# Patient Record
Sex: MALE | URBAN - NONMETROPOLITAN AREA
[De-identification: names, ages, dates, MRNs, and addresses within clinical notes are randomized per-mention and may not be internally consistent; named-entity substitution may affect disease eponyms.]

---

## 2020-05-08 ENCOUNTER — NURSE TRIAGE (OUTPATIENT)
Dept: CALL CENTER | Facility: HOSPITAL | Age: 20
End: 2020-05-08

## 2020-05-08 NOTE — TELEPHONE ENCOUNTER
"    Reason for Disposition  • [1] Dry, itchy skin AND [2] due to soaps or cold/dry weather    Additional Information  • Negative: Red, scaly, itchy rash between the toes  • Negative: Blisters rather than a crack  • Negative: Child sounds very sick or weak to the triager  • Negative: [1] Looks infected (spreading redness, red streak) AND [2] fever  • Negative: [1] Looks infected AND [2] large red area (> 2 in. or 5 cm)  • Negative: [1] Cracked red lips AND [2] fever present > 5 days  • Negative: [1] Looks infected (spreading redness, red streak, pus) AND [2] no fever  • Negative: [1] Bleeding from cracked lips AND [2] 3 or more times  • Negative: [1] Cracks on feet AND [2] interferes with walking  • Negative: Cracks from thumb-sucking or finger-sucking  • Negative: [1] Widespread peeling skin AND [2] cause unknown  • Negative: [1] After 2 weeks of treatment AND [2] cracked lips not healed  • Negative: [1] After 2 weeks of treatment AND [2] cracked skin not healed  • Negative: [1] After 2 weeks of treatment AND [2] dry skin is still itchy (Exception: chronic problem)  • Negative: [1] Patches of dry, itchy skin AND [2] a chronic problem  • Negative: Cracked skin on the feet  • Negative: Cracked skin on the hands  • Negative: Chapped lips    Answer Assessment - Initial Assessment Questions  1. APPEARANCE of SKIN: \"What does it look like?\"      Dry skin  2. PAIN: \"Is there any pain?\" If so, ask: \"How bad is it?\"      No  3. LOCATION: \"Where is the cracked skin located?\"      Cheek bone and forehead  4. ONSET: \"When did the cracked skin begin?\"      Yesterday  5. CAUSE: \"What do you think is causing the cracked skin?\"      Did not immediately apply lotion after his shower  6. INFECTION: \"Does it look infected?\"      No redness    Protocols used: CRACKED OR DRY SKIN-PEDIATRIC-AH      "

## 2020-05-09 ENCOUNTER — NURSE TRIAGE (OUTPATIENT)
Dept: CALL CENTER | Facility: HOSPITAL | Age: 20
End: 2020-05-09

## 2020-05-09 NOTE — TELEPHONE ENCOUNTER
Reason for Disposition  • [1] Localized peeling skin AND [2] present > 7 days    Additional Information  • Negative: Sounds like a life-threatening emergency to the triager  • Negative: Eczema has been diagnosed  • Negative: [1] Age < 2 years AND [2] in the diaper area  • Negative: Rash begins in the first week of life  • Negative: [1] Between the toes AND [2] itchy rash  • Negative: [1] Near the nostrils (nasal openings) AND [2] sores or scabs  • Negative: Acne on the face in school-aged child or older  • Negative: Fifth Disease suspected (red cheeks on both sides and no fever now)  • Negative: Ringworm suspected (round pink patch, slowly increasing in size)  • Negative: Wart, suspected or diagnosed  • Negative: Mosquito bite suspected  • Negative: Insect bite suspected  • Negative: Boil suspected (very painful, red lump)  • Negative: Small red spots or water blisters on the palms, soles, fingers and toes  • Negative: [1] Blisters of hands or feet AND [2] from friction  • Negative: [1] Chickenpox vaccine within last 3 weeks AND [2] several small water blisters or bumps  • Negative: Poison ivy, oak or sumac contact suspected  • Negative: Wound infection suspected (spreading redness or pus) in traumatic wound  • Negative: Wound infection suspected (spreading redness or pus) in surgical wound  • Negative: Impetigo suspected (superficial small sores usually covered by a soft yellow scab)  • Negative: Sores or skin ulcers, not a rash  • Negative: Localized lump (or swelling) without redness or rash  • Negative: Shingles (zoster) suspected (Rash grouped in a stripe or band on one side of body. Starts with red bumps changing to water blisters).  • Negative: Jock itch rash suspected (red itchy rash on inner upper thighs near genital area that starts in the groin crease)  • Negative: [1] Localized purple or blood-colored spots or dots AND [2] not from injury or friction AND [3] fever  • Negative: [1] Baby < 1 month old  "AND [2] tiny water blisters or pimples (like chickenpox) (Exception : If it looks like erythema toxicum: 1-inch red blotches with a tiny white lump in the center that look like insect bites, continue with triage)  • Negative: Child sounds very sick or weak to the triager  • Negative: [1] Localized purple or blood-colored spots or dots AND [2] not from injury or friction AND [3] no fever  • Negative: [1] Fever AND [2] bright red area or red streak  • Negative: [1] Fever AND [2] localized rash is very painful  • Negative: [1] Looks infected AND [2] large red area (> 2 in. or 5 cm)  • Negative: [1] Looks infected (spreading redness, pus) AND [2] no fever  • Negative: [1] Localized rash is very painful AND [2] no fever  • Negative: Looks like a boil, infected sore, deep ulcer or other infected rash (Exception: pimples)  • Negative: [1] Blisters AND [2] unexplained (Exception: Poison Ivy)  • Negative: Rash grouped in a stripe or band  • Negative: Lyme disease suspected (bull's eye rash, tick bite or exposure)  • Negative: [1] Teenager AND [2] genital area rash  • Negative: Fever present > 3 days (72 hours)  • Negative: [1] Using prescription cream or ointment AND [2] causes severe itch or burning when applied  • Negative: [1] Using non-prescription cream or ointment AND [2] causes itch or burning where applied  • Negative: [1] Pimples (localized) AND [2] no improvement using care advice per guideline    Answer Assessment - Initial Assessment Questions  1. APPEARANCE of RASH: \"What does the rash look like?\" \"What color is the rash?\"      Pinkish/  2. PETECHIAE SUSPECTED: For purple or deep red rashes, assess: \"Does the rash cristobal?\"       yes  3. LOCATION: \"Where is the rash located?\"     face  4. NUMBER: \"How many spots are there?\"     many  5. SIZE: \"How big are the spots?\" (Inches, centimeters or compare to size of a coin)     small  6. ONSET: \"When did the rash start?\"    yesterday  7. ITCHING: \"Does the rash itch?\" " "If so, ask: \"How bad is the itch?\"       no    Protocols used: RASH OR REDNESS - LOCALIZED-PEDIATRIC-AH      "